# Patient Record
Sex: FEMALE | Race: WHITE
[De-identification: names, ages, dates, MRNs, and addresses within clinical notes are randomized per-mention and may not be internally consistent; named-entity substitution may affect disease eponyms.]

---

## 2018-11-17 ENCOUNTER — HOSPITAL ENCOUNTER (EMERGENCY)
Dept: HOSPITAL 15 - ER | Age: 83
LOS: 1 days | Discharge: HOME | End: 2018-11-18
Payer: COMMERCIAL

## 2018-11-17 VITALS — HEIGHT: 64 IN | BODY MASS INDEX: 24.75 KG/M2 | WEIGHT: 145 LBS

## 2018-11-17 DIAGNOSIS — R22.0: Primary | ICD-10-CM

## 2018-11-17 DIAGNOSIS — I10: ICD-10-CM

## 2018-11-17 DIAGNOSIS — K21.9: ICD-10-CM

## 2018-11-17 DIAGNOSIS — Z87.442: ICD-10-CM

## 2018-11-17 DIAGNOSIS — R53.1: ICD-10-CM

## 2018-11-17 LAB — CRYSTALS UR QL AUTO: (no result) /HPF

## 2018-11-17 PROCEDURE — 70360 X-RAY EXAM OF NECK: CPT

## 2018-11-17 PROCEDURE — 70490 CT SOFT TISSUE NECK W/O DYE: CPT

## 2018-11-17 PROCEDURE — 85025 COMPLETE CBC W/AUTO DIFF WBC: CPT

## 2018-11-17 PROCEDURE — 99285 EMERGENCY DEPT VISIT HI MDM: CPT

## 2018-11-17 PROCEDURE — 36415 COLL VENOUS BLD VENIPUNCTURE: CPT

## 2018-11-17 PROCEDURE — 93005 ELECTROCARDIOGRAM TRACING: CPT

## 2018-11-17 PROCEDURE — 80053 COMPREHEN METABOLIC PANEL: CPT

## 2018-11-17 PROCEDURE — 96374 THER/PROPH/DIAG INJ IV PUSH: CPT

## 2018-11-17 PROCEDURE — 81001 URINALYSIS AUTO W/SCOPE: CPT

## 2018-11-17 PROCEDURE — 96375 TX/PRO/DX INJ NEW DRUG ADDON: CPT

## 2018-11-18 VITALS — SYSTOLIC BLOOD PRESSURE: 141 MMHG | DIASTOLIC BLOOD PRESSURE: 67 MMHG

## 2018-11-18 LAB
ALBUMIN SERPL-MCNC: 3.5 G/DL (ref 3.4–5)
ALP SERPL-CCNC: 127 U/L (ref 45–117)
ALT SERPL-CCNC: 17 U/L (ref 13–56)
ANION GAP SERPL CALCULATED.3IONS-SCNC: 8 MMOL/L (ref 5–15)
BILIRUB SERPL-MCNC: 1.4 MG/DL (ref 0.2–1)
BUN SERPL-MCNC: 14 MG/DL (ref 7–18)
BUN/CREAT SERPL: 13.6
CALCIUM SERPL-MCNC: 9.5 MG/DL (ref 8.5–10.1)
CHLORIDE SERPL-SCNC: 104 MMOL/L (ref 98–107)
CO2 SERPL-SCNC: 26 MMOL/L (ref 21–32)
GLUCOSE SERPL-MCNC: 126 MG/DL (ref 74–106)
HCT VFR BLD AUTO: 42.3 % (ref 36–46)
HGB BLD-MCNC: 13.9 G/DL (ref 12.2–16.2)
MCH RBC QN AUTO: 36.6 PG (ref 28–32)
MCV RBC AUTO: 111.1 FL (ref 80–100)
NRBC BLD QL AUTO: 0.1 %
POTASSIUM SERPL-SCNC: 4.2 MMOL/L (ref 3.5–5.1)
PROT SERPL-MCNC: 7.6 G/DL (ref 6.4–8.2)
SODIUM SERPL-SCNC: 138 MMOL/L (ref 136–145)

## 2019-09-26 ENCOUNTER — HOSPITAL ENCOUNTER (INPATIENT)
Dept: HOSPITAL 15 - ER | Age: 84
LOS: 3 days | Discharge: HOME HEALTH | DRG: 305 | End: 2019-09-29
Attending: INTERNAL MEDICINE | Admitting: NURSE PRACTITIONER
Payer: COMMERCIAL

## 2019-09-26 VITALS — BODY MASS INDEX: 24.24 KG/M2 | WEIGHT: 141.98 LBS | HEIGHT: 64 IN

## 2019-09-26 DIAGNOSIS — I16.1: ICD-10-CM

## 2019-09-26 DIAGNOSIS — Z90.710: ICD-10-CM

## 2019-09-26 DIAGNOSIS — D45: ICD-10-CM

## 2019-09-26 DIAGNOSIS — I10: ICD-10-CM

## 2019-09-26 DIAGNOSIS — E66.9: ICD-10-CM

## 2019-09-26 DIAGNOSIS — I65.29: ICD-10-CM

## 2019-09-26 DIAGNOSIS — J32.0: ICD-10-CM

## 2019-09-26 DIAGNOSIS — Z87.442: ICD-10-CM

## 2019-09-26 DIAGNOSIS — K21.9: ICD-10-CM

## 2019-09-26 DIAGNOSIS — E78.5: ICD-10-CM

## 2019-09-26 DIAGNOSIS — Z79.899: ICD-10-CM

## 2019-09-26 DIAGNOSIS — I16.0: Primary | ICD-10-CM

## 2019-09-26 LAB
ALBUMIN SERPL-MCNC: 3.7 G/DL (ref 3.4–5)
ALP SERPL-CCNC: 115 U/L (ref 45–117)
ALT SERPL-CCNC: 20 U/L (ref 13–56)
ANION GAP SERPL CALCULATED.3IONS-SCNC: 7 MMOL/L (ref 5–15)
BILIRUB SERPL-MCNC: 0.9 MG/DL (ref 0.2–1)
BUN SERPL-MCNC: 16 MG/DL (ref 7–18)
BUN/CREAT SERPL: 16
CALCIUM SERPL-MCNC: 9.5 MG/DL (ref 8.5–10.1)
CHLORIDE SERPL-SCNC: 110 MMOL/L (ref 98–107)
CO2 SERPL-SCNC: 25 MMOL/L (ref 21–32)
GLUCOSE SERPL-MCNC: 116 MG/DL (ref 74–106)
HCT VFR BLD AUTO: 41.4 % (ref 36–46)
HGB BLD-MCNC: 13.6 G/DL (ref 12.2–16.2)
MCH RBC QN AUTO: 35.5 PG (ref 28–32)
MCV RBC AUTO: 107.9 FL (ref 80–100)
NRBC BLD QL AUTO: 0.1 %
POTASSIUM SERPL-SCNC: 3.7 MMOL/L (ref 3.5–5.1)
PROT SERPL-MCNC: 7.1 G/DL (ref 6.4–8.2)
SODIUM SERPL-SCNC: 142 MMOL/L (ref 136–145)

## 2019-09-26 PROCEDURE — 93886 INTRACRANIAL COMPLETE STUDY: CPT

## 2019-09-26 PROCEDURE — 80048 BASIC METABOLIC PNL TOTAL CA: CPT

## 2019-09-26 PROCEDURE — 80061 LIPID PANEL: CPT

## 2019-09-26 PROCEDURE — 70496 CT ANGIOGRAPHY HEAD: CPT

## 2019-09-26 PROCEDURE — 93306 TTE W/DOPPLER COMPLETE: CPT

## 2019-09-26 PROCEDURE — 84443 ASSAY THYROID STIM HORMONE: CPT

## 2019-09-26 PROCEDURE — 84484 ASSAY OF TROPONIN QUANT: CPT

## 2019-09-26 PROCEDURE — 71045 X-RAY EXAM CHEST 1 VIEW: CPT

## 2019-09-26 PROCEDURE — 85025 COMPLETE CBC W/AUTO DIFF WBC: CPT

## 2019-09-26 PROCEDURE — 97530 THERAPEUTIC ACTIVITIES: CPT

## 2019-09-26 PROCEDURE — 80053 COMPREHEN METABOLIC PANEL: CPT

## 2019-09-26 PROCEDURE — 36415 COLL VENOUS BLD VENIPUNCTURE: CPT

## 2019-09-26 PROCEDURE — 70450 CT HEAD/BRAIN W/O DYE: CPT

## 2019-09-26 PROCEDURE — 96374 THER/PROPH/DIAG INJ IV PUSH: CPT

## 2019-09-26 PROCEDURE — 81001 URINALYSIS AUTO W/SCOPE: CPT

## 2019-09-26 PROCEDURE — 70498 CT ANGIOGRAPHY NECK: CPT

## 2019-09-26 PROCEDURE — 97116 GAIT TRAINING THERAPY: CPT

## 2019-09-26 NOTE — NUR
Telemetry admit from ER

WORKS,DAMION admitted to Telemetry unit after SBAR received.  Patient oriented to LAVELL FERNANDEZ, primary RN, unit, room, bed, and unit policies regarding patient care and visiting 
hours. Patient now on continuous telemetry monitoring, tele box #74 and telemetry reading on 
arrival to unit is SR. Patient weighed by bedscale and encouraged to call if they need 
something. All questions and concerns addressed, patient verbalized understanding.

## 2019-09-27 VITALS — SYSTOLIC BLOOD PRESSURE: 155 MMHG | DIASTOLIC BLOOD PRESSURE: 72 MMHG

## 2019-09-27 VITALS — DIASTOLIC BLOOD PRESSURE: 91 MMHG | SYSTOLIC BLOOD PRESSURE: 166 MMHG

## 2019-09-27 VITALS — SYSTOLIC BLOOD PRESSURE: 168 MMHG | DIASTOLIC BLOOD PRESSURE: 76 MMHG

## 2019-09-27 VITALS — SYSTOLIC BLOOD PRESSURE: 162 MMHG | DIASTOLIC BLOOD PRESSURE: 89 MMHG

## 2019-09-27 VITALS — DIASTOLIC BLOOD PRESSURE: 68 MMHG | SYSTOLIC BLOOD PRESSURE: 125 MMHG

## 2019-09-27 VITALS — DIASTOLIC BLOOD PRESSURE: 85 MMHG | SYSTOLIC BLOOD PRESSURE: 154 MMHG

## 2019-09-27 VITALS — SYSTOLIC BLOOD PRESSURE: 161 MMHG | DIASTOLIC BLOOD PRESSURE: 74 MMHG

## 2019-09-27 LAB
ANION GAP SERPL CALCULATED.3IONS-SCNC: 6 MMOL/L (ref 5–15)
BUN SERPL-MCNC: 16 MG/DL (ref 7–18)
BUN/CREAT SERPL: 17.4
CALCIUM SERPL-MCNC: 9.2 MG/DL (ref 8.5–10.1)
CHLORIDE SERPL-SCNC: 111 MMOL/L (ref 98–107)
CHOLEST SERPL-MCNC: 166 MG/DL (ref ?–200)
CO2 SERPL-SCNC: 26 MMOL/L (ref 21–32)
GLUCOSE SERPL-MCNC: 100 MG/DL (ref 74–106)
HDLC SERPL-MCNC: 56 MG/DL (ref 40–59)
POTASSIUM SERPL-SCNC: 4.3 MMOL/L (ref 3.5–5.1)
SODIUM SERPL-SCNC: 143 MMOL/L (ref 136–145)
TRIGL SERPL-MCNC: 83 MG/DL (ref ?–150)

## 2019-09-27 RX ADMIN — HYDROXYUREA SCH MG: 500 CAPSULE ORAL at 15:17

## 2019-09-27 RX ADMIN — FAMOTIDINE SCH MG: 20 TABLET, FILM COATED ORAL at 10:27

## 2019-09-27 RX ADMIN — FAMOTIDINE SCH MG: 20 TABLET, FILM COATED ORAL at 22:18

## 2019-09-27 RX ADMIN — LISINOPRIL SCH MG: 20 TABLET ORAL at 15:17

## 2019-09-27 RX ADMIN — LISINOPRIL SCH MG: 20 TABLET ORAL at 22:19

## 2019-09-27 RX ADMIN — ATORVASTATIN CALCIUM SCH MG: 20 TABLET, FILM COATED ORAL at 22:18

## 2019-09-27 RX ADMIN — ENOXAPARIN SODIUM SCH MG: 40 INJECTION SUBCUTANEOUS at 10:28

## 2019-09-27 NOTE — NUR
Pharmacy call/Hydroxyurea

Call to pharmacy again to verify hydroxyurea order. Saturday order should be a double dose. 
Orders verified with pharmacist. Tomorrows dose will be a double dose, totaling 1,000 mg.

## 2019-09-27 NOTE — NUR
Call to [facility]

Call to [facility] at this time to verify patient's home medications. No answer after 5 
minutes. Will attempt to call at another time.

## 2019-09-27 NOTE — NUR
RECEIVED REPORT FROM QUINTON CISNEROS

WILL ASSUME CARE OF PATIENT. PATIENT IS SLEEPING IN BED. NO S/S OF DISTRESS NOTED. FALL 
PRECAUTIONS IN PLACE. CALL LIGHT WITHIN REACH.

## 2019-09-27 NOTE — NUR
[facility]

Spoke to [facility]. Metoprolol order verified. Will change existing home medication list in 
the chart.

## 2019-09-28 VITALS — SYSTOLIC BLOOD PRESSURE: 165 MMHG | DIASTOLIC BLOOD PRESSURE: 83 MMHG

## 2019-09-28 VITALS — DIASTOLIC BLOOD PRESSURE: 81 MMHG | SYSTOLIC BLOOD PRESSURE: 152 MMHG

## 2019-09-28 VITALS — DIASTOLIC BLOOD PRESSURE: 82 MMHG | SYSTOLIC BLOOD PRESSURE: 163 MMHG

## 2019-09-28 VITALS — DIASTOLIC BLOOD PRESSURE: 78 MMHG | SYSTOLIC BLOOD PRESSURE: 157 MMHG

## 2019-09-28 VITALS — SYSTOLIC BLOOD PRESSURE: 156 MMHG | DIASTOLIC BLOOD PRESSURE: 74 MMHG

## 2019-09-28 LAB
ANION GAP SERPL CALCULATED.3IONS-SCNC: 6 MMOL/L (ref 5–15)
BUN SERPL-MCNC: 17 MG/DL (ref 7–18)
BUN/CREAT SERPL: 17
CALCIUM SERPL-MCNC: 9.5 MG/DL (ref 8.5–10.1)
CHLORIDE SERPL-SCNC: 111 MMOL/L (ref 98–107)
CO2 SERPL-SCNC: 25 MMOL/L (ref 21–32)
GLUCOSE SERPL-MCNC: 100 MG/DL (ref 74–106)
HCT VFR BLD AUTO: 38.5 % (ref 36–46)
HGB BLD-MCNC: 13.2 G/DL (ref 12.2–16.2)
MCH RBC QN AUTO: 37 PG (ref 28–32)
MCV RBC AUTO: 108.1 FL (ref 80–100)
NRBC BLD QL AUTO: 0.1 %
POTASSIUM SERPL-SCNC: 3.6 MMOL/L (ref 3.5–5.1)
SODIUM SERPL-SCNC: 142 MMOL/L (ref 136–145)

## 2019-09-28 RX ADMIN — ENOXAPARIN SODIUM SCH MG: 40 INJECTION SUBCUTANEOUS at 09:50

## 2019-09-28 RX ADMIN — FAMOTIDINE SCH MG: 20 TABLET, FILM COATED ORAL at 09:48

## 2019-09-28 RX ADMIN — FAMOTIDINE SCH MG: 20 TABLET, FILM COATED ORAL at 22:26

## 2019-09-28 RX ADMIN — HYDROXYUREA SCH MG: 500 CAPSULE ORAL at 09:49

## 2019-09-28 RX ADMIN — LISINOPRIL SCH MG: 20 TABLET ORAL at 09:47

## 2019-09-28 RX ADMIN — PANTOPRAZOLE SODIUM SCH MG: 40 TABLET, DELAYED RELEASE ORAL at 09:48

## 2019-09-28 RX ADMIN — ATORVASTATIN CALCIUM SCH MG: 20 TABLET, FILM COATED ORAL at 22:25

## 2019-09-28 RX ADMIN — LISINOPRIL SCH MG: 20 TABLET ORAL at 22:27

## 2019-09-28 NOTE — NUR
PHYSICAL THERAPY

OOB AMBULATED WITH PHYSICAL THERAPIST IN HALLWAY WITH WALKER,BACK TO BED TOLERATED ACTIVITY 
WELL,NO C/O PAIN NO DISCOMFORT OR DISTRESS.

## 2019-09-28 NOTE — NUR
PATIENT LEFT TO RADIOLOGY 

Patient to radiology for test. Patient has been NPO since dinner. Patient taken down in 
wheelchair, she is A&O X's 4 and shows no s/s of distress.

## 2019-09-28 NOTE — NUR
RADIOLOGY CALLED,PATIENT FOR CTA,PATIENT EATING DINNER,PATIENT INSTRUCTED NOT TO EAT OR 
DRINK FOR 4 HOURS FOR CTA PROCEDURE VERBALIZED UNDERSTANDING.

## 2019-09-28 NOTE — NUR
CLOSING

PATIENT RESTING IN BED. NO S/S OF DISTRESS. RT AT BEDSIDE GIVING SCHEDULED MED NEB TX

CALL LIGHT WITHIN REACH

WILL ENDORSE CARE TO GRISELDA RN 

-------------------------------------------------------------------------------

Addendum: 09/28/19 at 0655 by Aditi Sheikh RN

-------------------------------------------------------------------------------

DISREGARD NOTE. INCORRECT PATIENT

PATIENT SLEEPING. NO S/S OF DISTRESS. FALL PRECAUTIONS IN PLACE. WILL ENDORSE CARE TO 
XAVIER ALCANTARA

## 2019-09-28 NOTE — NUR
PATIENT REFUSED MRA OF NECK TO BE DONE,RE ASSURED AND EXPLAINED INDICATION AND WILL GET 
MEDICATION TO RELAX HER FOR PROCEDURE BUT STILL REFUSED.

## 2019-09-28 NOTE — NUR
Opening Shift Note

Report received and assumed care of patient, awake and alert.  No S/S of distress/SOB or 
pain.  Instructed on POC,Nursing routines,needing urine specimen, call light within reach 
patient reminded instructed to call for assistance, PRN,patient verbalized understanding, 
will continue to monitor for changes Q1hr and PRN.

## 2019-09-29 VITALS — SYSTOLIC BLOOD PRESSURE: 143 MMHG | DIASTOLIC BLOOD PRESSURE: 66 MMHG

## 2019-09-29 VITALS — SYSTOLIC BLOOD PRESSURE: 156 MMHG | DIASTOLIC BLOOD PRESSURE: 77 MMHG

## 2019-09-29 VITALS — SYSTOLIC BLOOD PRESSURE: 126 MMHG | DIASTOLIC BLOOD PRESSURE: 62 MMHG

## 2019-09-29 VITALS — DIASTOLIC BLOOD PRESSURE: 71 MMHG | SYSTOLIC BLOOD PRESSURE: 154 MMHG

## 2019-09-29 VITALS — DIASTOLIC BLOOD PRESSURE: 62 MMHG | SYSTOLIC BLOOD PRESSURE: 126 MMHG

## 2019-09-29 LAB
ANION GAP SERPL CALCULATED.3IONS-SCNC: 7 MMOL/L (ref 5–15)
BUN SERPL-MCNC: 19 MG/DL (ref 7–18)
BUN/CREAT SERPL: 18.6
CALCIUM SERPL-MCNC: 9.5 MG/DL (ref 8.5–10.1)
CHLORIDE SERPL-SCNC: 110 MMOL/L (ref 98–107)
CO2 SERPL-SCNC: 25 MMOL/L (ref 21–32)
GLUCOSE SERPL-MCNC: 83 MG/DL (ref 74–106)
HCT VFR BLD AUTO: 39 % (ref 36–46)
HGB BLD-MCNC: 13.7 G/DL (ref 12.2–16.2)
MCH RBC QN AUTO: 37.2 PG (ref 28–32)
MCV RBC AUTO: 105.7 FL (ref 80–100)
NRBC BLD QL AUTO: 0.1 %
POTASSIUM SERPL-SCNC: 4 MMOL/L (ref 3.5–5.1)
SODIUM SERPL-SCNC: 142 MMOL/L (ref 136–145)

## 2019-09-29 RX ADMIN — LISINOPRIL SCH MG: 20 TABLET ORAL at 09:38

## 2019-09-29 RX ADMIN — PANTOPRAZOLE SODIUM SCH MG: 40 TABLET, DELAYED RELEASE ORAL at 09:38

## 2019-09-29 RX ADMIN — ENOXAPARIN SODIUM SCH MG: 40 INJECTION SUBCUTANEOUS at 09:39

## 2019-09-29 RX ADMIN — FAMOTIDINE SCH MG: 20 TABLET, FILM COATED ORAL at 09:38

## 2019-09-29 RX ADMIN — HYDROXYUREA SCH MG: 500 CAPSULE ORAL at 09:37

## 2019-09-29 NOTE — NUR
ROUNDS

Dr APRIL Hernandez at bedside for rounds, new orders received and followed through. Patient 
updated on plan of care, verbalized understanding.

## 2019-09-29 NOTE — NUR
Return call received from Dr Decker, verbalized cleared from Cardiology for discharge and 
patient to follow up with Cardiology within 1-2 weeks. Awaiting return call from Dr Valdivia.

## 2019-09-29 NOTE — NUR
Call placed to Dr Valdivia and Dr Decker. Per Dr APRIL Hernandez, we need clearance from 
Neurology and Cardiology before discharge. Awaiting return call.

## 2019-09-29 NOTE — NUR
NEUROLOGY

Return call received from Dr Valdivia, verbalized patient is cleared for discharge home from 
Neurology.

## 2019-09-29 NOTE — NUR
Opening Shift Note

Assumed care of patient, awake, alert and oriented X4. No S/S of distress/SOB or pain. Tele# 
74, sinus rhythm @ 71 bpm. IV to left antecubital, 18 gauge, patent and saline locked. 
Instructed on POC and to call for assist PRN, verbalized understanding. Bed locked, in 
lowest position, call light within reach, will continue to monitor for changes Q1hr and PRN.

## 2019-09-29 NOTE — NUR
Discharge instructions given as ordered. Encourage to follow up with PMD as instructed. All 
questions and concerns addressed. Patient verbalized understanding. Medication 
reconciliation form completed and copy given to patient. Home medications held in Pharmacy 
returned to patient. IV removed with catheter intact, pressure dressing applied. Telemetry 
unit returned to ICU. Patient awaiting transportation.

## 2019-09-30 NOTE — NUR
D/C Planning 



Per  consult for home health safety evaluation and physical therapy. No call or Page by 
bedside RN. Contacted and faxed medical records to [facility]. 
[facility] to accept Pt. Per Tara from [facility] Ph:( [phone]) Fax:( [phone]) Pt has been accepted and service to start within 48hrs upon d/c 
day. Contacted [facility] Ph:( [phone]) Fax:( [phone]) faxed medical records 
requesting for authorization to be given to [facility]. Followed up call to [facility] spoke to Tara. Tara advised me authorization was received. 

-------------------------------------------------------------------------------

Addendum: 09/30/19 at 1816 by EVONNE SOOD 

-------------------------------------------------------------------------------

Amended: Links added.

## 2019-09-30 NOTE — NUR
9/29/19

Pt ambulated hallway using FWW x50ft w/ CGA. Pt was issued HEP for strengthening UE and LE 
along w/ red and green TB. Reviewed and discussed with patient exercises for understanding.

-------------------------------------------------------------------------------

Addendum: 09/30/19 at 0854 by Apryl Echeverria PT

-------------------------------------------------------------------------------

Amended: Links added.

## 2021-04-19 ENCOUNTER — HOSPITAL ENCOUNTER (EMERGENCY)
Dept: HOSPITAL 15 - ER | Age: 86
Discharge: HOME | End: 2021-04-19
Payer: COMMERCIAL

## 2021-04-19 VITALS — SYSTOLIC BLOOD PRESSURE: 144 MMHG | DIASTOLIC BLOOD PRESSURE: 55 MMHG

## 2021-04-19 VITALS — WEIGHT: 145 LBS | HEIGHT: 64 IN | BODY MASS INDEX: 24.75 KG/M2

## 2021-04-19 DIAGNOSIS — E78.5: ICD-10-CM

## 2021-04-19 DIAGNOSIS — I10: ICD-10-CM

## 2021-04-19 DIAGNOSIS — Z90.710: ICD-10-CM

## 2021-04-19 DIAGNOSIS — Z98.890: ICD-10-CM

## 2021-04-19 DIAGNOSIS — Z79.899: ICD-10-CM

## 2021-04-19 DIAGNOSIS — R07.89: Primary | ICD-10-CM

## 2021-04-19 DIAGNOSIS — Z87.442: ICD-10-CM

## 2021-04-19 DIAGNOSIS — K21.9: ICD-10-CM

## 2021-04-19 LAB
ALBUMIN SERPL-MCNC: 3.7 G/DL (ref 3.4–5)
ALP SERPL-CCNC: 110 U/L (ref 45–117)
ALT SERPL-CCNC: 19 U/L (ref 13–56)
ANION GAP SERPL CALCULATED.3IONS-SCNC: 8 MMOL/L (ref 5–15)
BILIRUB SERPL-MCNC: 0.9 MG/DL (ref 0.2–1)
BUN SERPL-MCNC: 16 MG/DL (ref 7–18)
BUN/CREAT SERPL: 12.4
CALCIUM SERPL-MCNC: 9.9 MG/DL (ref 8.5–10.1)
CHLORIDE SERPL-SCNC: 106 MMOL/L (ref 98–107)
CO2 SERPL-SCNC: 25 MMOL/L (ref 21–32)
GLUCOSE SERPL-MCNC: 102 MG/DL (ref 74–106)
HCT VFR BLD AUTO: 41.1 % (ref 36–46)
HGB BLD-MCNC: 13.6 G/DL (ref 12.2–16.2)
MCH RBC QN AUTO: 36.9 PG (ref 28–32)
MCV RBC AUTO: 111.6 FL (ref 80–100)
NRBC BLD QL AUTO: 0.1 %
POTASSIUM SERPL-SCNC: 3.9 MMOL/L (ref 3.5–5.1)
PROT SERPL-MCNC: 7.3 G/DL (ref 6.4–8.2)
SODIUM SERPL-SCNC: 139 MMOL/L (ref 136–145)

## 2021-04-19 PROCEDURE — 71045 X-RAY EXAM CHEST 1 VIEW: CPT

## 2021-04-19 PROCEDURE — 36415 COLL VENOUS BLD VENIPUNCTURE: CPT

## 2021-04-19 PROCEDURE — 93005 ELECTROCARDIOGRAM TRACING: CPT

## 2021-04-19 PROCEDURE — 85025 COMPLETE CBC W/AUTO DIFF WBC: CPT

## 2021-04-19 PROCEDURE — 80053 COMPREHEN METABOLIC PANEL: CPT

## 2021-04-19 PROCEDURE — 84484 ASSAY OF TROPONIN QUANT: CPT

## 2023-11-06 NOTE — NUR
Opening note 

Received report from day shift RN. Patient is A&O X's 4 with no s/s of distress. Patient 
denies SOB or chest pain. Educated patient that she has a CT test scheduled for 10PM and she 
will not be able to eat or drink anything until that is complete. Patient verbalized 
understanding and agreed to have it done tonight. Educated patient on POC and to use call 
light when in need of assistance. Patient verbalized understanding. Patient has walker 
available at bedside and within reach of patient. Set bed alarm for safety. Bed is in 
lowest/locked position with side rails up X's 2 and call light is within reach of patient. 
Will continue care not sepsis